# Patient Record
Sex: FEMALE
[De-identification: names, ages, dates, MRNs, and addresses within clinical notes are randomized per-mention and may not be internally consistent; named-entity substitution may affect disease eponyms.]

---

## 2023-07-25 ENCOUNTER — APPOINTMENT (OUTPATIENT)
Dept: ORTHOPEDIC SURGERY | Facility: CLINIC | Age: 68
End: 2023-07-25
Payer: MEDICARE

## 2023-07-25 DIAGNOSIS — M25.552 PAIN IN LEFT HIP: ICD-10-CM

## 2023-07-25 DIAGNOSIS — Z86.39 PERSONAL HISTORY OF OTHER ENDOCRINE, NUTRITIONAL AND METABOLIC DISEASE: ICD-10-CM

## 2023-07-25 DIAGNOSIS — Z85.44 PERSONAL HISTORY OF MALIGNANT NEOPLASM OF OTHER FEMALE GENITAL ORGANS: ICD-10-CM

## 2023-07-25 DIAGNOSIS — I51.9 HEART DISEASE, UNSPECIFIED: ICD-10-CM

## 2023-07-25 PROBLEM — Z00.00 ENCOUNTER FOR PREVENTIVE HEALTH EXAMINATION: Status: ACTIVE | Noted: 2023-07-25

## 2023-07-25 PROCEDURE — 99203 OFFICE O/P NEW LOW 30 MIN: CPT

## 2023-07-25 PROCEDURE — 73503 X-RAY EXAM HIP UNI 4/> VIEWS: CPT | Mod: LT

## 2023-07-25 NOTE — DISCUSSION/SUMMARY
[de-identified] : At this time I discussed with the patient she may have some tendinitis and bursitis of the left hip versus some lumbar radiculopathy.  And she do some warm compresses.  I gave her a prescription for physical therapy.  She did not want any medication for the pain.  I offered her follow-up appointment in a few weeks for repeat evaluation if the pain is not improving.  She states she will call to make the appointment if she is not feeling better. Patient will call me if any other problems or concerns.  Patient verbalized understanding and agreed with the plan, all questions were answered in the office today.\par

## 2023-07-25 NOTE — IMAGING
[de-identified] : On examination she is nontender over the midline of the lumbar spine.  She is tender over the left paraspinal muscles.  She is tender over the iliac crest, mildly tender over the greater trochanter.  No tenderness to palpation over the groin.  She is able to straight leg raise against resistance without pain.  Negative logroll.  She has full range of motion of the hip, mild pain with internal and external rotation.  She has 5 out of 5 strength in the lower extremities, sensation is intact throughout the lower extremities, no saddle anesthesia.  The calves are soft and nontender.\par \par X-rays taken in the office today of the left hip and pelvis show mild degenerative changes, small spur off the greater trochanter.  No fractures or other bony abnormalities noted.

## 2023-07-25 NOTE — HISTORY OF PRESENT ILLNESS
[de-identified] : 68-year-old female is here today for evaluation of her left hip.  Patient states she suddenly started having pain to the lateral side of her left hip.  She states she felt it about 5 days ago.  She had no injury or trauma.  She states she started to feel little bit better and then the pain came back again.  She denies any pain in the groin.  She denies any pain radiating down the leg or any numbness or tingling.  She states she does have previous issues with her lower back.  She has not been taking anything for the pain.